# Patient Record
Sex: FEMALE | Race: WHITE
[De-identification: names, ages, dates, MRNs, and addresses within clinical notes are randomized per-mention and may not be internally consistent; named-entity substitution may affect disease eponyms.]

---

## 2018-09-17 ENCOUNTER — HOSPITAL ENCOUNTER (EMERGENCY)
Dept: HOSPITAL 25 - ED | Age: 23
Discharge: HOME | End: 2018-09-17
Payer: COMMERCIAL

## 2018-09-17 VITALS — DIASTOLIC BLOOD PRESSURE: 83 MMHG | SYSTOLIC BLOOD PRESSURE: 119 MMHG
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
## 2018-09-17 DIAGNOSIS — S10.91XA: Primary | ICD-10-CM

## 2018-09-17 DIAGNOSIS — S06.0X9A: ICD-10-CM

## 2018-09-17 PROCEDURE — 99282 EMERGENCY DEPT VISIT SF MDM: CPT
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
## 2022-07-08 RX ORDER — METHYLPREDNISOLONE 4 MG/1
TABLET ORAL
COMMUNITY

## 2022-07-08 RX ORDER — FAMOTIDINE 20 MG/1
TABLET, FILM COATED ORAL
COMMUNITY

## 2022-07-08 RX ORDER — TRIAMCINOLONE ACETONIDE 1 MG/G
CREAM TOPICAL
COMMUNITY
Start: 2021-09-30

## 2022-07-08 RX ORDER — DIAPER,BRIEF,INFANT-TODD,DISP
EACH MISCELLANEOUS
COMMUNITY